# Patient Record
Sex: MALE | Race: WHITE | NOT HISPANIC OR LATINO | Employment: FULL TIME | URBAN - METROPOLITAN AREA
[De-identification: names, ages, dates, MRNs, and addresses within clinical notes are randomized per-mention and may not be internally consistent; named-entity substitution may affect disease eponyms.]

---

## 2017-06-08 ENCOUNTER — OFFICE VISIT (OUTPATIENT)
Dept: URGENT CARE | Facility: PHYSICIAN GROUP | Age: 37
End: 2017-06-08
Payer: COMMERCIAL

## 2017-06-08 VITALS
SYSTOLIC BLOOD PRESSURE: 162 MMHG | RESPIRATION RATE: 18 BRPM | WEIGHT: 204 LBS | BODY MASS INDEX: 31.03 KG/M2 | TEMPERATURE: 99.1 F | HEART RATE: 110 BPM | DIASTOLIC BLOOD PRESSURE: 94 MMHG | OXYGEN SATURATION: 96 %

## 2017-06-08 DIAGNOSIS — R03.0 ELEVATED BLOOD PRESSURE READING: ICD-10-CM

## 2017-06-08 DIAGNOSIS — R19.7 DIARRHEA, UNSPECIFIED TYPE: ICD-10-CM

## 2017-06-08 PROCEDURE — 99203 OFFICE O/P NEW LOW 30 MIN: CPT | Performed by: NURSE PRACTITIONER

## 2017-06-08 ASSESSMENT — ENCOUNTER SYMPTOMS
DIARRHEA: 1
FEVER: 0
SHORTNESS OF BREATH: 0
CONSTIPATION: 0
ORTHOPNEA: 0
DIAPHORESIS: 0
VOMITING: 0
ABDOMINAL PAIN: 0
DIZZINESS: 0
CHILLS: 0
WEAKNESS: 0
PALPITATIONS: 0
HEARTBURN: 0

## 2017-06-08 NOTE — MR AVS SNAPSHOT
Sohan Davaloswell   2017 6:15 PM   Office Visit   MRN: [id number]    Department:  [facility]   Dept Phone:  [phone]    Description:  Male : 1980   Provider:  LUCY Beavers           Reason for Visit     Diarrhea x 2 days had an episode of dark/black stool, pt did take pepto this morning      Allergies as of 2017     No Known Allergies      You were diagnosed with     Diarrhea, unspecified type   [3443323]       Elevated blood pressure reading   [[id number]]         Vital Signs     Blood Pressure Pulse Temperature Respirations Weight Oxygen Saturation    162/94 mmHg 110 37.3 °C (99.1 °F) 18 92.534 kg (204 lb) 96%    Smoking Status                   Current Every Day Smoker           Basic Information     Date Of Birth Sex Race Ethnicity Preferred Language    1980 Male White Non- English      Health Maintenance        Date Due Completion Dates    IMM DTaP/Tdap/Td Vaccine (1 - Tdap) 3/13/2000 ---            Current Immunizations     No immunizations on file.      Below and/or attached are the medications your provider expects you to take. Review all of your home medications and newly ordered medications with your provider and/or pharmacist. Follow medication instructions as directed by your provider and/or pharmacist. Please keep your medication list with you and share with your provider. Update the information when medications are discontinued, doses are changed, or new medications (including over-the-counter products) are added; and carry medication information at all times in the event of emergency situations     Allergies:  No Known Allergies          Medications  Valid as of: 2017 -  7:01 PM    Generic Name Brand Name Tablet Size Instructions for use    Aspirin (Tablet Delayed Response) aspirin 500 MG Take  by mouth.        Hydrocodone-Acetaminophen (Tab) NORCO 5-325 MG Take 1-2 Tabs by mouth every 6 hours as needed.        Hydrocodone-Acetaminophen  (Tab) NORCO 5-325 MG Take 1-2 Tabs by mouth every 6 hours as needed.        MetroNIDAZOLE (Cream) METROCREAM 0.75 % Apply thin layer to affected area(s) after washing 2 times a day.        Tadalafil (Tab) CIALIS 10 MG Take 1 Tab by mouth as needed for Erectile Dysfunction.        .                 Medicines prescribed today were sent to:     [id number] - [place], [place] - [address] [place] [address]    [address] [place] [address] [place] [address]    Phone: [phone] Fax: [phone]    Open 24 Hours?: No      Medication refill instructions:       If your prescription bottle indicates you have medication refills left, it is not necessary to call your provider’s office. Please contact your pharmacy and they will refill your medication.    If your prescription bottle indicates you do not have any refills left, you may request refills at any time through one of the following ways: The online [employer] system (except Urgent Care), by calling your provider’s office, or by asking your pharmacy to contact your provider’s office with a refill request. Medication refills are processed only during regular business hours and may not be available until the next business day. Your provider may request additional information or to have a follow-up visit with you prior to refilling your medication.   *Please Note: Medication refills are assigned a new Rx number when refilled electronically. Your pharmacy may indicate that no refills were authorized even though a new prescription for the same medication is available at the pharmacy. Please request the medicine by name with the pharmacy before contacting your provider for a refill.           [employer] Access Code: [email or web address]  Expires: 2017  7:01 PM    Your email address is not on file at [facility].  Email Addresses are required for you to sign up for [employer], please contact [phone] to verify your personal information and to provide your email address prior to attempting to register  for [employer].    Sohan Zoran  [address] [place] [address]    [employer]  A secure, online tool to manage your health information     [employer]’s RetailTower® is a secure, online tool that connects you to your personalized health information from the privacy of your home -- day or night - making it very easy for you to manage your healthcare. Once the activation process is completed, you can even access your medical information using the RetailTower mauricio, which is available for free in the Apple Mauricio store or Google Play store.     To learn more about RetailTower, visit www."[employer]"/[employer]    There are two levels of access available (as shown below):   My Chart Features  [facility] Primary Care Doctor [facility]  Specialists [facility]  Urgent  Care Non-[facility] Primary Care Doctor   Email your healthcare team securely and privately 24/7 X X X    Manage appointments: schedule your next appointment; view details of past/upcoming appointments X      Request prescription refills. X      View recent personal medical records, including lab and immunizations X X X X   View health record, including health history, allergies, medications X X X X   Read reports about your outpatient visits, procedures, consult and ER notes X X X X   See your discharge summary, which is a recap of your hospital and/or ER visit that includes your diagnosis, lab results, and care plan X X  X     How to register for RetailTower:  Once your e-mail address has been verified, follow the following steps to sign up for RetailTower.     1. Go to  [email or web address]  2. Click on the Sign Up Now box, which takes you to the New Member Sign Up page. You will need to provide the following information:  a. Enter your RetailTower Access Code exactly as it appears at the top of this page. (You will not need to use this code after you’ve completed the sign-up process. If you do not sign up before the expiration date, you must request a new code.)   b. Enter your date of birth.    c. Enter your home email address.   d. Click Submit, and follow the next screen’s instructions.  3. Create a MySiteApp ID. This will be your MySiteApp login ID and cannot be changed, so think of one that is secure and easy to remember.  4. Create a MySiteApp password. You can change your password at any time.  5. Enter your Password Reset Question and Answer. This can be used at a later time if you forget your password.   6. Enter your e-mail address. This allows you to receive e-mail notifications when new information is available in MySiteApp.  7. Click Sign Up. You can now view your health information.    For assistance activating your MySiteApp account, call [phone]         Quit Tobacco Information     Do you want to quit using tobacco?    Quitting tobacco decreases risks of cancer, heart and lung disease, increases life expectancy, improves sense of taste and smell, and increases spending money, among other benefits.    If you are thinking about quitting, we can help.  • [facility] Quit Tobacco Program: [phone]  o Program occurs weekly for four weeks and includes pharmacist consultation on products to support quitting smoking or chewing tobacco. A provider referral is needed for pharmacist consultation.  • Tobacco Users Help Hotline: 5-814-QUITNOW (204-0241) or https://[place].quitlogix.org/  o Free, confidential telephone and online coaching for [place] residents. Sessions are designed on a schedule that is convenient for you. Eligible clients receive free nicotine replacement therapy.  • Nationally: www.smokefree.gov  o Information and professional assistance to support both immediate and long-term needs as you become, and remain, a non-smoker. Smokefree.gov allows you to choose the help that best fits your needs.

## 2017-06-09 NOTE — PROGRESS NOTES
Subjective:      Sohan Meehan is a 37 y.o. male who presents with Diarrhea            Diarrhea   Pertinent negatives include no abdominal pain, chills, fever or vomiting.   Patient comes in today with a 2 day history of diarrhea.  He reports that he and his family developed GI symptoms all after eating the same thing at [employer] 3 days ago.  He had some slight nausea but is tolerating po food and fluid.  He reports watery diarrhea with 3-4 episodes per day.  He took Pepto Bismol this morning and noted a dark stool this afternoon.  No tarry appearance, stool was still thin and watery.  No abdominal pain.  NO history of GI bleed or PUD.  He is a current smoker.  He has an elevated blood pressure reading today.  He reports that his BP is elevated at times and he declines to discuss medical management at this time.  He states he had an addition to pills and now has an eversion to any pill regardless of medical benefit.      Review of Systems   Constitutional: Negative for fever, chills, malaise/fatigue and diaphoresis.   Respiratory: Negative for shortness of breath.    Cardiovascular: Negative for chest pain, palpitations, orthopnea and leg swelling.   Gastrointestinal: Positive for diarrhea. Negative for heartburn, vomiting, abdominal pain and constipation.   Neurological: Negative for dizziness and weakness.     Medications, Allergies, and current problem list reviewed today in Epic     Objective:     /94 mmHg  Pulse 110  Temp(Src) 37.3 °C (99.1 °F)  Resp 18  Wt 92.534 kg (204 lb)  SpO2 96%     Physical Exam   Constitutional: He is oriented to person, place, and time. He appears well-developed and well-nourished. No distress.   Cardiovascular: Regular rhythm and normal heart sounds.  Exam reveals no gallop and no friction rub.    No murmur heard.  Tachycardia.   Pulmonary/Chest: Effort normal and breath sounds normal. No respiratory distress. He has no wheezes. He has no rales. He exhibits no  tenderness.   Abdominal: Soft. Normal appearance. He exhibits no shifting dullness, no distension, no pulsatile liver, no fluid wave, no ascites, no pulsatile midline mass and no mass. Bowel sounds are increased. There is no tenderness. There is no rigidity, no rebound, no guarding, no CVA tenderness, no tenderness at McBurney's point and negative Burgess's sign. No hernia.   Genitourinary:   Patient declined LAURA and guaiac test.    Neurological: He is alert and oriented to person, place, and time.   Skin: He is not diaphoretic.   Vitals reviewed.              Assessment/Plan:     1. Diarrhea, unspecified type     2. Elevated blood pressure reading       Advised patient that based on the history and exam findings, this is likely a self-limiting viral illness.  Differential includes food poisoning.  Pepto likely cause of dark stool.  There is no indication for antibiotics at this time.  Advance diet as tolerated.  Maintain adequate po hydration.  Encouraged blood pressure recheck and management if indicated.  Encouraged smoking cessation.  RTC in 3-4 days if symptoms persist, sooner if worse.  ED precautions reviewed.  Patient verbalized understanding of and agreed with plan of care.

## 2017-09-22 ENCOUNTER — HOSPITAL ENCOUNTER (OUTPATIENT)
Dept: RADIOLOGY | Facility: MEDICAL CENTER | Age: 37
End: 2017-09-22
Attending: FAMILY MEDICINE
Payer: COMMERCIAL

## 2017-09-22 ENCOUNTER — OFFICE VISIT (OUTPATIENT)
Dept: URGENT CARE | Facility: PHYSICIAN GROUP | Age: 37
End: 2017-09-22
Payer: COMMERCIAL

## 2017-09-22 VITALS
BODY MASS INDEX: 31.02 KG/M2 | SYSTOLIC BLOOD PRESSURE: 146 MMHG | TEMPERATURE: 98.3 F | OXYGEN SATURATION: 98 % | DIASTOLIC BLOOD PRESSURE: 78 MMHG | WEIGHT: 204 LBS | RESPIRATION RATE: 16 BRPM | HEART RATE: 115 BPM

## 2017-09-22 DIAGNOSIS — Z82.49 FAMILY HISTORY OF ANEURYSM: ICD-10-CM

## 2017-09-22 DIAGNOSIS — I10 ESSENTIAL HYPERTENSION: ICD-10-CM

## 2017-09-22 DIAGNOSIS — M54.50 ACUTE BILATERAL LOW BACK PAIN WITHOUT SCIATICA: ICD-10-CM

## 2017-09-22 DIAGNOSIS — R51.9 ACUTE INTRACTABLE HEADACHE, UNSPECIFIED HEADACHE TYPE: ICD-10-CM

## 2017-09-22 PROCEDURE — 99204 OFFICE O/P NEW MOD 45 MIN: CPT | Mod: 25 | Performed by: FAMILY MEDICINE

## 2017-09-22 PROCEDURE — 70450 CT HEAD/BRAIN W/O DYE: CPT

## 2017-09-22 PROCEDURE — 96372 THER/PROPH/DIAG INJ SC/IM: CPT | Performed by: FAMILY MEDICINE

## 2017-09-22 RX ORDER — LISINOPRIL 20 MG/1
20 TABLET ORAL DAILY
Qty: 30 TAB | Refills: 1 | Status: SHIPPED | OUTPATIENT
Start: 2017-09-22

## 2017-09-22 RX ORDER — BUTALBITAL, ACETAMINOPHEN, CAFFEINE AND CODEINE PHOSPHATE 50; 325; 40; 30 MG/1; MG/1; MG/1; MG/1
1 CAPSULE ORAL EVERY 4 HOURS PRN
Qty: 30 CAP | Refills: 0 | Status: SHIPPED | OUTPATIENT
Start: 2017-09-22 | End: 2019-07-14

## 2017-09-22 RX ORDER — METAXALONE 800 MG/1
800 TABLET ORAL 3 TIMES DAILY
Qty: 30 TAB | Refills: 1 | Status: SHIPPED | OUTPATIENT
Start: 2017-09-22 | End: 2019-07-14

## 2017-09-22 RX ORDER — KETOROLAC TROMETHAMINE 30 MG/ML
60 INJECTION, SOLUTION INTRAMUSCULAR; INTRAVENOUS ONCE
Status: COMPLETED | OUTPATIENT
Start: 2017-09-22 | End: 2017-09-22

## 2017-09-22 RX ADMIN — KETOROLAC TROMETHAMINE 60 MG: 30 INJECTION, SOLUTION INTRAMUSCULAR; INTRAVENOUS at 17:53

## 2017-09-22 ASSESSMENT — ENCOUNTER SYMPTOMS
FOCAL WEAKNESS: 0
SENSORY CHANGE: 0
BLURRED VISION: 1
EYE REDNESS: 0
WEIGHT LOSS: 0
EYE DISCHARGE: 0

## 2017-09-22 NOTE — PROGRESS NOTES
"Subjective:      Sohan Meehan is a 37 y.o. male who presents with Headache (x 4-5 days with lower back pain)            3-4 days global HA with worse sx's in hatband region. Waxes and wanes currently 7/10 and worse with cough. Initially insidious onset and not thunderclap. Associated low back and buttock pain. Waxes and wanes/at times severe. No radiation. No myelopathy. Associated neck pain. Minimal relief with OTC medications. Sitting for long periods makes worse. No other aggravating or alleviating factors.   No fever. Chills yesterday. No recent mosquito bites. PMH HTN has not been on medications recently.         Review of Systems   Constitutional: Negative for fever, malaise/fatigue and weight loss.   HENT: Negative for ear pain, hearing loss and sore throat.    Eyes: Positive for blurred vision (intermittent with cough). Negative for discharge and redness.   Respiratory: Positive for cough (\"smokers\"). Negative for hemoptysis.    Cardiovascular: Negative for chest pain, palpitations and leg swelling.   Gastrointestinal: Negative for nausea and vomiting.   Genitourinary: Negative for flank pain and hematuria.   Musculoskeletal: Negative for myalgias and neck pain.   Skin: Negative for itching and rash.   Neurological: Negative for sensory change and focal weakness.     .  Medications, Allergies, and current problem list reviewed today in Epic  Past Medical History:   Diagnosis Date   • Headache    HTN    FH: brain aneurysm on both sides, HTN  Social History   Substance Use Topics   • Smoking status: Current Every Day Smoker   • Smokeless tobacco: Never Used   • Alcohol use No          Objective:     /78   Pulse (!) 115   Temp 36.8 °C (98.3 °F)   Resp 16   Wt 92.5 kg (204 lb)   SpO2 98%   BMI 31.02 kg/m²      Physical Exam   Constitutional: He is oriented to person, place, and time. He appears well-developed and well-nourished. No distress.   HENT:   Head: Normocephalic and atraumatic.   Right " Ear: External ear normal.   Left Ear: External ear normal.   Nose: Nose normal.   Mouth/Throat: Oropharynx is clear and moist.   Eyes: Conjunctivae and EOM are normal. Pupils are equal, round, and reactive to light.   Neck: Normal range of motion. Neck supple. No JVD present.   Cardiovascular: Normal rate, regular rhythm and normal heart sounds.    No murmur heard.  Pulmonary/Chest: Effort normal and breath sounds normal.   Abdominal: Soft. Bowel sounds are normal. He exhibits no mass. There is no tenderness.   Musculoskeletal: He exhibits no edema or tenderness.   Back: tender bilateral lower paralumbar and SI region. Flexion limited to 45 degrees. No point bony tenderness or deformity. Skin intact.      Neurological: He is alert and oriented to person, place, and time.   Bilateral lower extremity strength and sensory intact.  Negative straight leg raise.  Unable to elicit DTR bilateral LE, no clonus  Negative Romberg  Gait stable  No focal deficits   Skin: Skin is warm and dry. No rash noted.   Psychiatric: He has a normal mood and affect. His behavior is normal.               Assessment/Plan:   CT head: negative per radiology    1. Acute intractable headache, unspecified headache type  CT-HEAD W/O    ketorolac (TORADOL) injection 60 mg    butalbital-acetaminophen-caffeine-codeine (FIORICET W/CODEINE) -13-30 MG per capsule    metaxalone (SKELAXIN) 800 MG Tab   2. Essential hypertension  lisinopril (PRINIVIL) 20 MG Tab   3. Acute bilateral low back pain without sciatica     4. Family history of aneurysm         Differential diagnosis, natural history, supportive care, and indications for immediate follow-up discussed at length.   Suspect HA is tension type  He will f/u pcp with BP log and to discuss further screening of FH brain aneurysm

## 2017-09-29 ASSESSMENT — ENCOUNTER SYMPTOMS
NECK PAIN: 0
SORE THROAT: 0
HEMOPTYSIS: 0
FEVER: 0
VOMITING: 0
FLANK PAIN: 0
NAUSEA: 0
COUGH: 1
MYALGIAS: 0
PALPITATIONS: 0

## 2017-10-20 ENCOUNTER — HOSPITAL ENCOUNTER (OUTPATIENT)
Dept: RADIOLOGY | Facility: MEDICAL CENTER | Age: 37
End: 2017-10-20
Attending: FAMILY MEDICINE
Payer: COMMERCIAL

## 2017-10-20 ENCOUNTER — OFFICE VISIT (OUTPATIENT)
Dept: URGENT CARE | Facility: PHYSICIAN GROUP | Age: 37
End: 2017-10-20
Payer: COMMERCIAL

## 2017-10-20 VITALS
RESPIRATION RATE: 16 BRPM | SYSTOLIC BLOOD PRESSURE: 136 MMHG | HEIGHT: 67 IN | WEIGHT: 206 LBS | TEMPERATURE: 98.6 F | DIASTOLIC BLOOD PRESSURE: 78 MMHG | BODY MASS INDEX: 32.33 KG/M2 | HEART RATE: 86 BPM | OXYGEN SATURATION: 97 %

## 2017-10-20 DIAGNOSIS — S69.91XA INJURY OF RIGHT HAND, INITIAL ENCOUNTER: ICD-10-CM

## 2017-10-20 DIAGNOSIS — S62.316A CLOSED DISPLACED FRACTURE OF BASE OF FIFTH METACARPAL BONE OF RIGHT HAND, INITIAL ENCOUNTER: ICD-10-CM

## 2017-10-20 PROCEDURE — 99214 OFFICE O/P EST MOD 30 MIN: CPT | Performed by: FAMILY MEDICINE

## 2017-10-20 PROCEDURE — 73130 X-RAY EXAM OF HAND: CPT | Mod: RT

## 2017-10-20 RX ORDER — HYDROCODONE BITARTRATE AND ACETAMINOPHEN 5; 325 MG/1; MG/1
1-2 TABLET ORAL EVERY 6 HOURS PRN
Qty: 20 TAB | Refills: 0 | Status: SHIPPED | OUTPATIENT
Start: 2017-10-20 | End: 2019-07-14

## 2017-10-20 ASSESSMENT — ENCOUNTER SYMPTOMS
ROS SKIN COMMENTS: NO ABRASION OR LACERATION
SENSORY CHANGE: 0
WEIGHT LOSS: 0
FOCAL WEAKNESS: 0

## 2017-10-20 NOTE — PROGRESS NOTES
"Subjective:      Sohan Meehan is a 37 y.o. male who presents with Hand Injury (pt hit door tuesday)            3 days right hand pain, swelling after striking a door with fist. Initial evaluation outside of [facility] system told him his hand is fractured but no xray report or f/u plan. Pain is moderate to severe with significant swelling. Right hand dominant. No prior injury. Minimal relief with OTC analgesic. No other aggravating or alleviating factors.          Review of Systems   Constitutional: Negative for malaise/fatigue and weight loss.   Musculoskeletal:        No elbow or shoulder pain   Skin:        No abrasion or laceration     Neurological: Negative for sensory change and focal weakness.     .  Speech is clear. Patient is appropriate and cooperative.       Objective:     /78   Pulse 86   Temp 37 °C (98.6 °F)   Resp 16   Ht 1.702 m (5' 7\")   Wt 93.4 kg (206 lb)   SpO2 97%   BMI 32.26 kg/m²      Physical Exam   Constitutional: He appears well-developed and well-nourished. No distress.   HENT:   Head: Normocephalic and atraumatic.   Musculoskeletal:        Hands:              Assessment/Plan:   Xray: fracture R 5th proximal metacarpal    1. Injury of right hand, initial encounter    - DX-HAND 3+ RIGHT; Future    2. Closed displaced fracture of base of fifth metacarpal bone of right hand, initial encounter    - REFERRAL TO ORTHOPEDICS  - hydrocodone-acetaminophen (NORCO) 5-325 MG Tab per tablet; Take 1-2 Tabs by mouth every 6 hours as needed.  Dispense: 20 Tab; Refill: 0    Ulnar gutter splint placed.     "

## 2018-01-26 ENCOUNTER — HOSPITAL ENCOUNTER (OUTPATIENT)
Dept: RADIOLOGY | Facility: MEDICAL CENTER | Age: 38
End: 2018-01-26
Attending: NURSE PRACTITIONER
Payer: COMMERCIAL

## 2018-01-26 ENCOUNTER — OFFICE VISIT (OUTPATIENT)
Dept: URGENT CARE | Facility: PHYSICIAN GROUP | Age: 38
End: 2018-01-26
Payer: COMMERCIAL

## 2018-01-26 VITALS
HEART RATE: 122 BPM | BODY MASS INDEX: 32.33 KG/M2 | DIASTOLIC BLOOD PRESSURE: 74 MMHG | OXYGEN SATURATION: 94 % | SYSTOLIC BLOOD PRESSURE: 134 MMHG | HEIGHT: 67 IN | TEMPERATURE: 98.4 F | WEIGHT: 206 LBS

## 2018-01-26 DIAGNOSIS — S60.221A CONTUSION OF RIGHT HAND, INITIAL ENCOUNTER: ICD-10-CM

## 2018-01-26 DIAGNOSIS — S69.92XA HAND INJURY, LEFT, INITIAL ENCOUNTER: ICD-10-CM

## 2018-01-26 PROCEDURE — 99213 OFFICE O/P EST LOW 20 MIN: CPT | Performed by: NURSE PRACTITIONER

## 2018-01-26 PROCEDURE — 73130 X-RAY EXAM OF HAND: CPT | Mod: LT

## 2018-01-26 ASSESSMENT — ENCOUNTER SYMPTOMS
SENSORY CHANGE: 0
FALLS: 0
DIZZINESS: 0
CHILLS: 0
TINGLING: 0
MYALGIAS: 0
NAUSEA: 0

## 2018-01-26 ASSESSMENT — LIFESTYLE VARIABLES: SUBSTANCE_ABUSE: 0

## 2018-01-27 NOTE — PROGRESS NOTES
"Subjective:      Sohan Meehan is a 37 y.o. male who presents with Hand Injury (L Hand Injury. )    [facility] reviewed and updated as necessary in EPIC electronic record with patient today.  Medications including OTC medications reviewed with patient.         No Known Allergies          HPI This is a new problem C/O left hand injury at home last night. He was getting something out from under the seat of his car. His hand got stuck and he pulled it hard. Immediate pain and swelling. [place] a pop noise. Today increased pain and swelling Pain is moderate. Treatment tried: ice, rest.   Hurts worse with movement.     Review of Systems   Constitutional: Negative for chills.   Gastrointestinal: Negative for nausea.   Musculoskeletal: Positive for joint pain. Negative for falls and myalgias.   Skin: Negative for rash.   Neurological: Negative for dizziness, tingling and sensory change.   Psychiatric/Behavioral: Negative for substance abuse.          Objective:     /74   Pulse (!) 122   Temp 36.9 °C (98.4 °F)   Ht 1.702 m (5' 7\")   Wt 93.4 kg (206 lb)   SpO2 94%   BMI 32.26 kg/m²      Physical Exam   Constitutional: He is oriented to person, place, and time. Vital signs are normal. He appears well-developed and well-nourished.   HENT:   Head: Normocephalic and atraumatic.   Neck: Normal range of motion.   Cardiovascular: Normal rate, regular rhythm and normal heart sounds.    Pulmonary/Chest: Effort normal and breath sounds normal.   Musculoskeletal:        Left hand: He exhibits decreased range of motion, tenderness and swelling. He exhibits no bony tenderness, normal two-point discrimination, normal capillary refill, no deformity and no laceration. Normal sensation noted. Normal strength noted.        Hands:  Neurological: He is alert and oriented to person, place, and time.   Skin: Skin is warm and dry. Capillary refill takes less than 2 seconds.   Psychiatric: He has a normal mood and affect. His speech is normal " "and behavior is normal. Judgment and thought content normal.   Nursing note and vitals reviewed.        Xray Left hand:   1/26/2018 7:21 PM    HISTORY/REASON FOR EXAM:  Pain/Deformity Following Trauma.  LEFT hand injury    TECHNIQUE/EXAM DESCRIPTION AND NUMBER OF VIEWS:  3 views of the LEFT hand.    COMPARISON: None    FINDINGS:  Soft tissue swelling of the 2nd digit.  No radiopaque foreign body.  No soft tissue gas.  Dorsal soft tissue swelling over the metacarpals.  Carpal alignment is preserved.  No fracture or dislocation.   Impression       1.  Soft tissue swelling of the LEFT 2nd digit and over the dorsum of the hand.  2.  No fracture or dislocation.   Reading Provider Reading Date   Woodrow George M.D. Jan 26, 2018            Assessment/Plan:     1. Hand injury, left, initial encounter  DX-HAND 3+ LEFT   2. Contusion of right hand, initial encounter       \"P.R.I.C.E.\" discussed with pt (protection from further injury, rest, ice, compession and elevation).   OTC age appropriate, analgesic of choice. such as acetaminophen (Ex brand name: Tylenol), ibuprofen (Ex brand names: Advil, Motrin), or naproxen   (Ex brand names: Aleve, Naprosyn).  Follow manufactures dosing and safety precautions.   FU prn    "

## 2018-02-13 ENCOUNTER — HOSPITAL ENCOUNTER (OUTPATIENT)
Dept: RADIOLOGY | Facility: MEDICAL CENTER | Age: 38
End: 2018-02-13
Attending: FAMILY MEDICINE
Payer: COMMERCIAL

## 2018-02-13 ENCOUNTER — OFFICE VISIT (OUTPATIENT)
Dept: URGENT CARE | Facility: PHYSICIAN GROUP | Age: 38
End: 2018-02-13
Payer: COMMERCIAL

## 2018-02-13 VITALS
OXYGEN SATURATION: 96 % | TEMPERATURE: 98.2 F | HEIGHT: 67 IN | SYSTOLIC BLOOD PRESSURE: 148 MMHG | RESPIRATION RATE: 16 BRPM | DIASTOLIC BLOOD PRESSURE: 100 MMHG | BODY MASS INDEX: 30.61 KG/M2 | WEIGHT: 195 LBS | HEART RATE: 120 BPM

## 2018-02-13 DIAGNOSIS — S69.92XS: ICD-10-CM

## 2018-02-13 PROCEDURE — 99213 OFFICE O/P EST LOW 20 MIN: CPT | Performed by: FAMILY MEDICINE

## 2018-02-13 PROCEDURE — 73130 X-RAY EXAM OF HAND: CPT | Mod: LT

## 2018-02-13 ASSESSMENT — PAIN SCALES - GENERAL: PAINLEVEL: 8=MODERATE-SEVERE PAIN

## 2018-02-14 ASSESSMENT — ENCOUNTER SYMPTOMS
WEAKNESS: 0
NUMBNESS: 0
FEVER: 0
PAIN: 1

## 2018-02-14 NOTE — PROGRESS NOTES
"Subjective:   Sohan Meehan is a 37 y.o. male who presents for Pain (Left knuckle pain, x 19 days)     Pain   This is a new problem. The current episode started 1 to 4 weeks ago. The problem occurs constantly. The problem has been unchanged. Pertinent negatives include no fever, numbness or weakness.     Review of Systems   Constitutional: Negative for fever.   Neurological: Negative for weakness and numbness.      Objective:   /100   Pulse (!) 120   Temp 36.8 °C (98.2 °F)   Resp 16   Ht 1.702 m (5' 7\")   Wt 88.5 kg (195 lb)   SpO2 96%   BMI 30.54 kg/m²   Physical Exam   Constitutional: He is oriented to person, place, and time. He appears well-developed and well-nourished. No distress.   HENT:   Head: Normocephalic and atraumatic.   Eyes: Conjunctivae are normal. Pupils are equal, round, and reactive to light.   Cardiovascular: Normal rate and regular rhythm.    Pulmonary/Chest: Effort normal and breath sounds normal.   Musculoskeletal:        Left hand: He exhibits decreased range of motion, tenderness, deformity and swelling. Normal sensation noted. Normal strength noted.   Neurological: He is alert and oriented to person, place, and time.   Skin: Skin is warm and dry.   Psychiatric: He has a normal mood and affect. Thought content normal.   Vitals reviewed.       Assessment/Plan:     1. Hand injuries, left, sequela  - DX-HAND 3+ LEFT; Future  - REFERRAL TO ORTHOPEDICS  Differential diagnosis, natural history, supportive care, and indications for immediate follow-up discussed.   Use over-the-counter pain reliever, such as acetaminophen (Tylenol), ibuprofen (Advil, Motrin) or naproxen (Aleve) as needed; follow package directions for dosing.   "

## 2018-12-12 ENCOUNTER — TELEPHONE (OUTPATIENT)
Dept: SCHEDULING | Facility: IMAGING CENTER | Age: 38
End: 2018-12-12

## 2018-12-13 ENCOUNTER — OFFICE VISIT (OUTPATIENT)
Dept: URGENT CARE | Facility: PHYSICIAN GROUP | Age: 38
End: 2018-12-13
Payer: COMMERCIAL

## 2018-12-13 VITALS
DIASTOLIC BLOOD PRESSURE: 100 MMHG | BODY MASS INDEX: 31.08 KG/M2 | RESPIRATION RATE: 14 BRPM | HEART RATE: 99 BPM | WEIGHT: 198 LBS | SYSTOLIC BLOOD PRESSURE: 140 MMHG | HEIGHT: 67 IN | TEMPERATURE: 97.7 F | OXYGEN SATURATION: 96 %

## 2018-12-13 DIAGNOSIS — S29.011A MUSCLE STRAIN OF CHEST WALL, INITIAL ENCOUNTER: ICD-10-CM

## 2018-12-13 DIAGNOSIS — I10 HYPERTENSION, UNSPECIFIED TYPE: ICD-10-CM

## 2018-12-13 DIAGNOSIS — Z72.0 TOBACCO ABUSE: ICD-10-CM

## 2018-12-13 PROCEDURE — 99214 OFFICE O/P EST MOD 30 MIN: CPT | Performed by: FAMILY MEDICINE

## 2018-12-13 RX ORDER — LISINOPRIL 20 MG/1
20 TABLET ORAL DAILY
Qty: 60 TAB | Refills: 0 | Status: SHIPPED | OUTPATIENT
Start: 2018-12-13 | End: 2019-07-14

## 2018-12-13 RX ORDER — MELOXICAM 15 MG/1
15 TABLET ORAL DAILY
Qty: 30 TAB | Refills: 0 | Status: SHIPPED | OUTPATIENT
Start: 2018-12-13 | End: 2019-07-14

## 2018-12-13 NOTE — PROGRESS NOTES
"Subjective:      Chief Complaint   Patient presents with   • Blood Pressure Problem     high blood pressure/ arm muscle                       Chest Pain   This is a recurrent problem. Episode onset: 1 week.  The onset quality is undetermined. The problem occurs intermittently. The problem has been unchanged. The pain is present in the substernal region. The pain is mild. The quality of the pain is described as \"soreness\" and area is TTP. The pain does not radiate. Associated symptoms includes:  None.  Pertinent negatives include no claudication, cough, exertional chest pressure, fever, nausea, near-syncope, numbness, syncope or vomiting.   Patient has tried NSAIDs for the symptoms. The treatment provided some relief.        #2.   HTN - was previously on lisinopril.   D/c'd due to not having PCP.   He wants to restart      #3.   Current smoker.    Requesting chantix.   He has been on it before and did not have side effects.       Past Medical History:   Diagnosis Date   • Headache(784.0)          Social History   Substance Use Topics   • Smoking status: Current Every Day Smoker   • Smokeless tobacco: Never Used   • Alcohol use Yes         Current Outpatient Prescriptions on File Prior to Visit   Medication Sig Dispense Refill   • hydrocodone-acetaminophen (NORCO) 5-325 MG Tab per tablet Take 1-2 Tabs by mouth every 6 hours as needed. 20 Tab 0   • lisinopril (PRINIVIL) 20 MG Tab Take 1 Tab by mouth every day. 30 Tab 1   • butalbital-acetaminophen-caffeine-codeine (FIORICET W/CODEINE) -00-30 MG per capsule Take 1 Cap by mouth every four hours as needed for Headache. 30 Cap 0   • metaxalone (SKELAXIN) 800 MG Tab Take 1 Tab by mouth 3 times a day. 30 Tab 1   • hydrocodone-acetaminophen (NORCO) 5-325 MG TABS per tablet Take 1-2 Tabs by mouth every 6 hours as needed. 15 Tab 0   • tadalafil (CIALIS) 10 MG tablet Take 1 Tab by mouth as needed for Erectile Dysfunction. 10 Each 1   • hydrocodone-acetaminophen (NORCO) " "5-325 MG TABS per tablet Take 1-2 Tabs by mouth every 6 hours as needed. 15 Each 0   • aspirin 500 MG EC tablet Take  by mouth.     • metronidazole (METROCREAM) 0.75 % cream Apply thin layer to affected area(s) after washing 2 times a day. 45 g 5     No current facility-administered medications on file prior to visit.          History reviewed. No pertinent family history.              Review of Systems   Constitutional: Negative for fever, chills and malaise/fatigue.   Eyes: Negative for vision changes, d/c.    Respiratory: Negative for cough and sputum production.    Cardiovascular: Negative for PND or  palpitations.   Gastrointestinal: Negative for nausea, vomiting, abdominal pain, diarrhea and constipation.   Genitourinary: Negative for dysuria, urgency and frequency.   Skin: Negative for rash or  itching.   Neurological: Negative for dizziness and tingling.   Psychiatric/Behavioral: Negative for depression.   Hematologic/lymphatic - denies bruising or excessive bleeding  All other systems reviewed and are negative.       Objective:     Blood pressure 140/100, pulse 99, temperature 36.5 °C (97.7 °F), temperature source Temporal, resp. rate 14, height 1.702 m (5' 7\"), weight 89.8 kg (198 lb), SpO2 96 %.      Physical Exam   Constitutional: pt is oriented to person, place, and time. Pt appears well-developed and well-nourished.   HENT:   Head: Normocephalic and atraumatic.   Mouth/Throat: Oropharynx is clear and moist.   Eyes: Conjunctivae and EOM are normal. Pupils are equal, round, and reactive to light. No scleral icterus.   Neck: Normal range of motion. Neck supple. No JVD present. No thyromegaly present.   Cardiovascular: Normal rate, regular rhythm, normal heart sounds and intact distal pulses.  Exam reveals no gallop and no friction rub.    No murmur heard.  Pulmonary/Chest: Effort normal and breath sounds normal. No respiratory distress. Pt has no wheezes. Pt has no rales. Pt exhibits tenderness to " palpation over left pectoral muscle.   Abdominal: Soft.   Musculoskeletal: Normal range of motion. Pt exhibits no edema.   Lymphadenopathy:     Pt has no cervical adenopathy.   Neurological: pt is alert and oriented to person, place, and time. No cranial nerve deficit.   Skin: Skin is warm and dry. No erythema.   Psychiatric: pt has a normal mood and affect. patient's behavior is normal.     EKG interpretation - normal sinus rhythm. No   QRS morphology changes.   There is some concave ST elevations in septal leads diagnostic for early repolarization.  QT interval is normal. Axis is positive. No signs of ischemia.       Assessment/Plan:        1. Muscle strain of chest wall, initial encounter   EKG  reassuring  - meloxicam (MOBIC) 15 MG tablet; Take 1 Tab by mouth every day.  Dispense: 30 Tab; Refill: 0  - EKG - Clinic Performed    2. Hypertension, unspecified type   will restart on lisinopril.    He has appt with new PCP in one month    3. Tobacco abuse  Start on chantix and choose quit date.

## 2019-07-14 ENCOUNTER — OFFICE VISIT (OUTPATIENT)
Dept: URGENT CARE | Facility: PHYSICIAN GROUP | Age: 39
End: 2019-07-14
Payer: COMMERCIAL

## 2019-07-14 VITALS
DIASTOLIC BLOOD PRESSURE: 88 MMHG | HEART RATE: 94 BPM | TEMPERATURE: 98.6 F | BODY MASS INDEX: 29.29 KG/M2 | HEIGHT: 67 IN | RESPIRATION RATE: 22 BRPM | OXYGEN SATURATION: 96 % | SYSTOLIC BLOOD PRESSURE: 120 MMHG | WEIGHT: 186.6 LBS

## 2019-07-14 DIAGNOSIS — L02.419 AXILLARY ABSCESS: ICD-10-CM

## 2019-07-14 PROCEDURE — 10061 I&D ABSCESS COMP/MULTIPLE: CPT | Performed by: FAMILY MEDICINE

## 2019-07-14 RX ORDER — HYDROCODONE BITARTRATE AND ACETAMINOPHEN 5; 325 MG/1; MG/1
1-2 TABLET ORAL EVERY 8 HOURS PRN
Qty: 10 TAB | Refills: 0 | Status: SHIPPED | OUTPATIENT
Start: 2019-07-14 | End: 2019-07-19

## 2019-07-19 RX ORDER — SULFAMETHOXAZOLE AND TRIMETHOPRIM 800; 160 MG/1; MG/1
1 TABLET ORAL EVERY 12 HOURS
Qty: 14 TAB | Refills: 0 | Status: SHIPPED | OUTPATIENT
Start: 2019-07-19 | End: 2019-07-26

## 2019-07-19 ASSESSMENT — ENCOUNTER SYMPTOMS
MYALGIAS: 0
BRUISES/BLEEDS EASILY: 0
WEIGHT LOSS: 0
SENSORY CHANGE: 0
FOCAL WEAKNESS: 0

## 2019-07-19 NOTE — PROGRESS NOTES
"Subjective:      Sohan Meehan is a 39 y.o. male who presents with Mass (R arm pit x2 weeks) and Muscle Pain (x4-5 days)            2 weeks progressively worse painful lump right axilla.  Past medical history of axillary abscesses.  No fever.  No drainage.  Pain is moderate to severe.  No home remedies tried.  Pain is worse when he is moving his arm at work.  No other aggravating or alleviating factors.        Review of Systems   Constitutional: Negative for malaise/fatigue and weight loss.   Musculoskeletal: Negative for joint pain and myalgias.   Skin: Negative for itching and rash.   Neurological: Negative for sensory change and focal weakness.   Endo/Heme/Allergies: Does not bruise/bleed easily.          Objective:     /88 (BP Location: Right arm, Patient Position: Sitting, BP Cuff Size: Adult)   Pulse 94   Temp 37 °C (98.6 °F) (Temporal)   Resp (!) 22   Ht 1.702 m (5' 7.01\")   Wt 84.6 kg (186 lb 9.6 oz)   SpO2 96%   BMI 29.22 kg/m²      Physical Exam   Constitutional: He appears well-developed and well-nourished. No distress.   HENT:   Head: Normocephalic.   Cardiovascular: Normal rate, regular rhythm and normal heart sounds.    Pulmonary/Chest: Effort normal and breath sounds normal.   Skin: Skin is warm and dry. No rash noted.   2.5 cm diameter red raised fluctuant region right axilla.  No active drainage.  No lymphadenopathy.          Procedure: Incision and Drainage  -Risks, benefits, and alternatives discussed. Risks including infection, bleeding, nerve damage, and poor cosmetic outcome  -Sterile technique throughout  -Local anesthesia with 2% lidocaine with epinephrine  -Incision with #11 blade into fluctuant area with purulent material expressed    -Cavity probed and any loculations bluntly taken down with hemostat  -Irrigated copiously with NS  -Packed with 1/4\" gauze  -Minimal bleeding with good hemostasis achieved  -The patient tolerated the procedure well         Assessment/Plan:     1. " Axillary abscess  HYDROcodone-acetaminophen (NORCO) 5-325 MG Tab per tablet    Consent for Opiate Prescription    sulfamethoxazole-trimethoprim (BACTRIM DS) 800-160 MG tablet     Differential diagnosis, natural history, supportive care, and indications for immediate follow-up discussed at length.     Wound care  Follow-up in 48 hours for recheck.

## 2019-10-05 ENCOUNTER — OFFICE VISIT (OUTPATIENT)
Dept: URGENT CARE | Facility: PHYSICIAN GROUP | Age: 39
End: 2019-10-05
Payer: COMMERCIAL

## 2019-10-05 VITALS
HEIGHT: 68 IN | BODY MASS INDEX: 28.04 KG/M2 | WEIGHT: 185 LBS | SYSTOLIC BLOOD PRESSURE: 142 MMHG | TEMPERATURE: 98.5 F | RESPIRATION RATE: 16 BRPM | DIASTOLIC BLOOD PRESSURE: 106 MMHG | HEART RATE: 101 BPM | OXYGEN SATURATION: 99 %

## 2019-10-05 DIAGNOSIS — H00.012 HORDEOLUM EXTERNUM OF RIGHT LOWER EYELID: ICD-10-CM

## 2019-10-05 PROCEDURE — 99214 OFFICE O/P EST MOD 30 MIN: CPT | Performed by: PHYSICIAN ASSISTANT

## 2019-10-05 RX ORDER — POLYMYXIN B SULFATE AND TRIMETHOPRIM 1; 10000 MG/ML; [USP'U]/ML
1 SOLUTION OPHTHALMIC 4 TIMES DAILY
Qty: 10 ML | Refills: 0 | Status: SHIPPED | OUTPATIENT
Start: 2019-10-05 | End: 2019-10-10

## 2019-10-06 ASSESSMENT — ENCOUNTER SYMPTOMS
EYE DISCHARGE: 1
FOREIGN BODY SENSATION: 0
EYE REDNESS: 1
EYE ITCHING: 0
BLURRED VISION: 0

## 2019-10-07 NOTE — PROGRESS NOTES
"Subjective:      Sohan Meehan is a 39 y.o. male who presents with Eye Problem (R eye bump on lower lid, oozing, painful, red x2days, pt believes to be stye)            Patient presents with:  Eye Problem: R eye bump on lower lid, oozing, painful, red x2days, pt believes to be stye        Eye Problem    The right eye is affected. This is a new problem. The current episode started yesterday. The problem occurs constantly. The problem has been gradually worsening. There was no injury mechanism. The pain is at a severity of 5/10. The pain is moderate. There is no known exposure to pink eye. He does not wear contacts. Associated symptoms include an eye discharge and eye redness. Pertinent negatives include no blurred vision, foreign body sensation or itching. He has tried eye drops for the symptoms. The treatment provided no relief.       Review of Systems   Eyes: Positive for discharge and redness. Negative for blurred vision and itching.   All other systems reviewed and are negative.         Objective:     /106 (BP Location: Left arm, Patient Position: Sitting, BP Cuff Size: Adult)   Pulse (!) 101   Temp 36.9 °C (98.5 °F) (Temporal)   Resp 16   Ht 1.727 m (5' 8\")   Wt 83.9 kg (185 lb)   SpO2 99%   BMI 28.13 kg/m²      Physical Exam   Constitutional: He is oriented to person, place, and time. He appears well-developed and well-nourished. No distress.   HENT:   Head: Normocephalic and atraumatic.   Nose: Nose normal.   Mouth/Throat: Oropharynx is clear and moist.   Eyes: Pupils are equal, round, and reactive to light. Conjunctivae and EOM are normal. Lids are everted and swept, no foreign bodies found. Right eye exhibits discharge, exudate and hordeolum. No scleral icterus.       Neck: Normal range of motion. Neck supple. No JVD present.   Cardiovascular: Normal rate, regular rhythm and normal heart sounds.   Pulmonary/Chest: Effort normal and breath sounds normal.   Abdominal: Soft.   Musculoskeletal: " Normal range of motion.   Lymphadenopathy:     He has no cervical adenopathy.   Neurological: He is alert and oriented to person, place, and time.   Skin: Skin is warm and dry.   Nursing note and vitals reviewed.         Assessment/Plan:     1. Hordeolum externum of right lower eyelid  polymixin-trimethoprim (POLYTRIM) 93045-2.1 UNIT/ML-% Solution     PT can use cool/warm compress on affected area for relief of symptoms.     PT should follow up with PCP in 1-2 days for re-evaluation if symptoms have not improved.  Discussed red flags and reasons to return to UC or ED.  Pt and/or family verbalized understanding of diagnosis and follow up instructions and was offered informational handout on diagnosis.  PT discharged.

## 2020-04-26 ENCOUNTER — OFFICE VISIT (OUTPATIENT)
Dept: URGENT CARE | Facility: PHYSICIAN GROUP | Age: 40
End: 2020-04-26
Payer: COMMERCIAL

## 2020-04-26 VITALS
BODY MASS INDEX: 28.04 KG/M2 | WEIGHT: 185 LBS | DIASTOLIC BLOOD PRESSURE: 94 MMHG | TEMPERATURE: 97.2 F | RESPIRATION RATE: 16 BRPM | HEART RATE: 116 BPM | HEIGHT: 68 IN | SYSTOLIC BLOOD PRESSURE: 160 MMHG | OXYGEN SATURATION: 97 %

## 2020-04-26 DIAGNOSIS — M62.830 MUSCLE SPASM OF BACK: ICD-10-CM

## 2020-04-26 DIAGNOSIS — M62.838 MUSCLE SPASM OF SHOULDER REGION: ICD-10-CM

## 2020-04-26 DIAGNOSIS — S46.811A STRAIN OF RIGHT TRAPEZIUS MUSCLE, INITIAL ENCOUNTER: Primary | ICD-10-CM

## 2020-04-26 PROCEDURE — 99214 OFFICE O/P EST MOD 30 MIN: CPT | Performed by: PHYSICIAN ASSISTANT

## 2020-04-26 RX ORDER — CYCLOBENZAPRINE HCL 10 MG
10 TABLET ORAL 3 TIMES DAILY PRN
Qty: 30 TAB | Refills: 0 | Status: SHIPPED | OUTPATIENT
Start: 2020-04-26

## 2020-04-26 RX ORDER — AMOXICILLIN 500 MG/1
CAPSULE ORAL
COMMUNITY
Start: 2020-04-17

## 2020-04-26 RX ORDER — IBUPROFEN 800 MG/1
TABLET ORAL
COMMUNITY
Start: 2020-04-17

## 2020-04-26 ASSESSMENT — ENCOUNTER SYMPTOMS
FALLS: 0
MUSCLE SPASMS: 1
SPUTUM PRODUCTION: 0
HEADACHES: 0
ARTHRALGIAS: 0
BACK PAIN: 1
SHORTNESS OF BREATH: 0
COUGH: 0
FEVER: 0
CHANGE IN BOWEL HABIT: 0
NECK PAIN: 0
NAUSEA: 0
ABDOMINAL PAIN: 0
PALPITATIONS: 0
JOINT SWELLING: 0
MYALGIAS: 1
ANOREXIA: 0

## 2020-04-26 NOTE — PROGRESS NOTES
Subjective:      Sohan Meehan is a 40 y.o. male who presents with Arm Pain (R arm, back pain, chest [id number] )    PMH:  has a past medical history of Headache(784.0). He also has no past medical history of ASTHMA, Diabetes, or Diabetes ([facility]).  MEDS:   Current Outpatient Medications:   •  amoxicillin (AMOXIL) 500 MG Cap, 1 CAP ORALLY 3 TIMES A DAY AS DIRECTED DON T CONSUME WITH ALCOHOL.MAY INTERFERE WITH BIRTH CONTROL, Disp: , Rfl:   •  ibuprofen (MOTRIN) 800 MG Tab, 1 TAB EVERY 12 HOURS AS NEEDED FOR PAIN.DON T TAKE WITH ALCOHOL.MAY INTERFERE WITH BIRTH CONTROL MED, Disp: , Rfl:   •  cyclobenzaprine (FLEXERIL) 10 MG Tab, Take 1 Tab by mouth 3 times a day as needed for Moderate Pain or Muscle Spasms., Disp: 30 Tab, Rfl: 0  •  lisinopril (PRINIVIL) 20 MG Tab, Take 1 Tab by mouth every day. (Patient not taking: Reported on 10/5/2019), Disp: 30 Tab, Rfl: 1  •  aspirin 500 MG EC tablet, Take  by mouth., Disp: , Rfl:   ALLERGIES: No Known Allergies  SURGHX: History reviewed. No pertinent surgical history.  SOCHX:  reports that he has been smoking cigarettes. He has never used smokeless tobacco. He reports current alcohol use. He reports that he does not use drugs.  FH: Reviewed with patient, not pertinent to this visit.           PT co right upper back, right bicep , right pectoral muscle and right low back muscle strain and spasm that began last week after moving a significant amount of furniture out of his home by himself.  Pt states he has been taking otc motrin with little relief.  Pt had a tooth pulled last week is still on amoxicillin.  Was given some hydrocodone for a few days for post op pain, has finished those.  Not taking any other medications. Pt states muscle spasms did not improve with pain medication, has used muscle relaxant before with good relief.   Pt is still working as a /.      Spasms   This is a new problem. The current episode started in the past 7 days. The problem occurs  "constantly. The problem has been unchanged. Associated symptoms include myalgias. Pertinent negatives include no abdominal pain, anorexia, arthralgias, change in bowel habit, chest pain, congestion, coughing, fever, headaches, joint swelling, nausea or neck pain. The symptoms are aggravated by bending, exertion, walking, standing and twisting (lifting/carrying). He has tried NSAIDs, position changes, rest and acetaminophen for the symptoms. The treatment provided no relief.       Review of Systems   Constitutional: Negative for fever.   HENT: Negative for congestion.    Respiratory: Negative for cough, sputum production and shortness of breath.    Cardiovascular: Negative for chest pain, palpitations and leg swelling.   Gastrointestinal: Negative for abdominal pain, anorexia, change in bowel habit and nausea.   Musculoskeletal: Positive for back pain, muscle spasms and myalgias. Negative for arthralgias, falls, joint pain, joint swelling and neck pain.   Neurological: Negative for headaches.   All other systems reviewed and are negative.         Objective:     /94   Pulse (!) 116   Temp 36.2 °C (97.2 °F) (Temporal)   Resp 16   Ht 1.727 m (5' 8\")   Wt 83.9 kg (185 lb)   SpO2 97%   BMI 28.13 kg/m²      Physical Exam  Vitals signs and nursing note reviewed.   Constitutional:       General: He is not in acute distress.     Appearance: Normal appearance. He is well-developed and normal weight. He is not ill-appearing.   HENT:      Head: Normocephalic and atraumatic.      Right Ear: External ear normal.      Left Ear: External ear normal.      Nose: Nose normal.      Mouth/Throat:      Mouth: Mucous membranes are moist.   Eyes:      Extraocular Movements: Extraocular movements intact.      Conjunctiva/sclera: Conjunctivae normal.      Pupils: Pupils are equal, round, and reactive to light.   Neck:      Musculoskeletal: Normal range of motion and neck supple.   Cardiovascular:      Rate and Rhythm: Regular " rhythm. Tachycardia present.      Pulses: Normal pulses.      Heart sounds: Normal heart sounds.   Pulmonary:      Effort: Pulmonary effort is normal.      Breath sounds: Normal breath sounds.   Abdominal:      General: Bowel sounds are normal.      Palpations: Abdomen is soft.   Musculoskeletal:      Right shoulder: He exhibits pain and spasm. He exhibits normal range of motion, no bony tenderness, no swelling, normal pulse and normal strength.      Cervical back: He exhibits tenderness, pain and spasm. He exhibits normal range of motion and no bony tenderness.      Lumbar back: He exhibits decreased range of motion (Secondary to pain/spasm), tenderness, pain and spasm. He exhibits no bony tenderness and normal pulse.        Back:         Arms:       Comments: DISTAL N/V INTACT TO BLE, NO SADDLE ANESTHESIA NOTED, NO MIDLINE TTP TO ENTIRE SPINE.    Skin:     General: Skin is warm and dry.   Neurological:      Mental Status: He is alert and oriented to person, place, and time.      Motor: No abnormal muscle tone.      Coordination: Coordination normal.      Deep Tendon Reflexes: Reflexes are normal and symmetric.   Psychiatric:         Mood and Affect: Mood normal.            Assessment/Plan:     1. Strain of right trapezius muscle, initial encounter  cyclobenzaprine (FLEXERIL) 10 MG Tab   2. Muscle spasm of back  cyclobenzaprine (FLEXERIL) 10 MG Tab   3. Muscle spasm of shoulder region  cyclobenzaprine (FLEXERIL) 10 MG Tab     Motrin/Advil/Ibuprophen 600 mg every 6 hours as needed for pain or fever.    PT instructed not to drive or operate heavy machinery or drink alcohol while taking this medication because it contains either a narcotic or benzodiazepines which causes drowsiness. PT verbalized understanding of these instructions.     [facility] Aware web site evaluation: I have obtained and reviewed patient utilization report from [facility] pharmacy database prior to writing prescription for controlled  substance.  No history of abuse.    PT should follow up with PCP in 1-2 days for re-evaluation if symptoms have not improved.  Discussed red flags and reasons to return to UC or ED.  Pt and/or family verbalized understanding of diagnosis and follow up instructions and was offered informational handout on diagnosis.  PT discharged.

## 2020-04-27 ENCOUNTER — TELEPHONE (OUTPATIENT)
Dept: URGENT CARE | Facility: PHYSICIAN GROUP | Age: 40
End: 2020-04-27

## 2020-04-29 NOTE — TELEPHONE ENCOUNTER
We never once discussed blood pressure medication during his visit, this was not one of his complaints.  PT will have to be seen by primary to get BP medication.  Please call him and tell him he needs to establish with one if he does not have one.  Kehinde might be able to help with that.  Thanks, Madeline

## 2021-02-15 ENCOUNTER — HOSPITAL ENCOUNTER (OUTPATIENT)
Facility: MEDICAL CENTER | Age: 41
End: 2021-02-15
Attending: PHYSICIAN ASSISTANT
Payer: COMMERCIAL

## 2021-02-15 ENCOUNTER — OFFICE VISIT (OUTPATIENT)
Dept: URGENT CARE | Facility: PHYSICIAN GROUP | Age: 41
End: 2021-02-15
Payer: COMMERCIAL

## 2021-02-15 VITALS
TEMPERATURE: 97.9 F | BODY MASS INDEX: 30.61 KG/M2 | HEIGHT: 67 IN | RESPIRATION RATE: 18 BRPM | DIASTOLIC BLOOD PRESSURE: 108 MMHG | OXYGEN SATURATION: 97 % | WEIGHT: 195 LBS | SYSTOLIC BLOOD PRESSURE: 154 MMHG | HEART RATE: 110 BPM

## 2021-02-15 DIAGNOSIS — M77.11 LATERAL EPICONDYLITIS OF RIGHT ELBOW: ICD-10-CM

## 2021-02-15 DIAGNOSIS — M25.562 ACUTE PAIN OF LEFT KNEE: ICD-10-CM

## 2021-02-15 LAB — URATE SERPL-MCNC: 8 MG/DL (ref 2.5–8.3)

## 2021-02-15 PROCEDURE — 99214 OFFICE O/P EST MOD 30 MIN: CPT | Performed by: PHYSICIAN ASSISTANT

## 2021-02-15 PROCEDURE — 84550 ASSAY OF BLOOD/URIC ACID: CPT

## 2021-02-15 RX ORDER — KETOROLAC TROMETHAMINE 30 MG/ML
30 INJECTION, SOLUTION INTRAMUSCULAR; INTRAVENOUS ONCE
Status: COMPLETED | OUTPATIENT
Start: 2021-02-15 | End: 2021-02-15

## 2021-02-15 RX ORDER — INDOMETHACIN 50 MG/1
50 CAPSULE ORAL 3 TIMES DAILY
Qty: 60 CAPSULE | Refills: 0 | Status: SHIPPED | OUTPATIENT
Start: 2021-02-15

## 2021-02-15 RX ORDER — KETOROLAC TROMETHAMINE 30 MG/ML
30 INJECTION, SOLUTION INTRAMUSCULAR; INTRAVENOUS ONCE
Status: DISCONTINUED | OUTPATIENT
Start: 2021-02-15 | End: 2021-02-15

## 2021-02-15 RX ORDER — OXYCODONE AND ACETAMINOPHEN 10; 325 MG/1; MG/1
1 TABLET ORAL EVERY 4 HOURS PRN
COMMUNITY

## 2021-02-15 RX ADMIN — KETOROLAC TROMETHAMINE 30 MG: 30 INJECTION, SOLUTION INTRAMUSCULAR; INTRAVENOUS at 13:48

## 2021-02-15 ASSESSMENT — ENCOUNTER SYMPTOMS
CHILLS: 0
FEVER: 0
DIZZINESS: 0
FATIGUE: 0
NAUSEA: 0
DIARRHEA: 0
VOMITING: 0
FALLS: 0
NUMBNESS: 0
SHORTNESS OF BREATH: 0
JOINT SWELLING: 1
ABDOMINAL PAIN: 0

## 2021-02-15 NOTE — PROGRESS NOTES
"Subjective:      Sohan Meehan is a 40 y.o. male who presents with Knee Pain (L knee pain and swelling  calf swelling , R arm pain, )            Knee Pain  This is a new problem. The current episode started yesterday. The problem occurs constantly. The problem has been unchanged. Associated symptoms include joint swelling. Pertinent negatives include no abdominal pain, chest pain, chills, congestion, fatigue, fever, nausea, numbness, rash or vomiting. Nothing aggravates the symptoms. He has tried nothing for the symptoms.     Patient presents to urgent care reporting left knee pain and swelling starting yesterday, gradually worsening in severity. No recent falls or trauma. No history of the same. He denies personal history of gout, although states it runs in his family. No history of kidney disease. He also reports right elbow pain that has been waxing and waning for 3 weeks. No recent injuries. He is right hand dominant and states he uses his right arm frequently at home and at work. No prior injuries. He hasn't tried taking any OTC medications for the pain.       Review of Systems   Constitutional: Negative for chills, fatigue and fever.   HENT: Negative for congestion.    Respiratory: Negative for shortness of breath.    Cardiovascular: Negative for chest pain.   Gastrointestinal: Negative for abdominal pain, diarrhea, nausea and vomiting.   Genitourinary: Negative.    Musculoskeletal: Positive for joint swelling. Negative for falls.        + knee pain   Skin: Negative for rash.   Neurological: Negative for dizziness and numbness.        Objective:     /108   Pulse (!) 110   Temp 36.6 °C (97.9 °F) (Temporal)   Resp 18   Ht 1.702 m (5' 7\")   Wt 88.5 kg (195 lb)   SpO2 97%   BMI 30.54 kg/m²        Physical Exam  Vitals and nursing note reviewed.   Constitutional:       General: He is not in acute distress.     Appearance: Normal appearance. He is well-developed. He is not ill-appearing.   HENT:      " Head: Normocephalic and atraumatic.      Right Ear: External ear normal.      Left Ear: External ear normal.   Eyes:      Conjunctiva/sclera: Conjunctivae normal.   Cardiovascular:      Rate and Rhythm: Normal rate.   Pulmonary:      Effort: Pulmonary effort is normal.   Musculoskeletal:         General: Normal range of motion.      Right elbow: No swelling or deformity. Normal range of motion. Tenderness present in lateral epicondyle. No radial head or medial epicondyle tenderness.      Cervical back: Normal range of motion.        Legs:       Comments: Left knee erythematous with slight edema and warmth to touch. Patient unable to fully flex or extend knee secondary to pain. No overlying abrasions or puncture wounds/    Skin:     General: Skin is warm and dry.   Neurological:      Mental Status: He is alert and oriented to person, place, and time.   Psychiatric:         Behavior: Behavior normal.             PMH:  has a past medical history of Headache(784.0). He also has no past medical history of ASTHMA, Diabetes, or Diabetes ([facility]).  MEDS:   Current Outpatient Medications:   •  oxyCODONE-acetaminophen (PERCOCET-10)  MG Tab, Take 1 tablet by mouth every four hours as needed for Severe Pain., Disp: , Rfl:   •  indomethacin (INDOCIN) 50 MG Cap, Take 1 capsule by mouth 3 times a day., Disp: 60 capsule, Rfl: 0  •  lisinopril (PRINIVIL) 20 MG Tab, Take 1 Tab by mouth every day., Disp: 30 Tab, Rfl: 1  •  amoxicillin (AMOXIL) 500 MG Cap, 1 CAP ORALLY 3 TIMES A DAY AS DIRECTED DON T CONSUME WITH ALCOHOL.MAY INTERFERE WITH BIRTH CONTROL, Disp: , Rfl:   •  ibuprofen (MOTRIN) 800 MG Tab, 1 TAB EVERY 12 HOURS AS NEEDED FOR PAIN.DON T TAKE WITH ALCOHOL.MAY INTERFERE WITH BIRTH CONTROL MED, Disp: , Rfl:   •  cyclobenzaprine (FLEXERIL) 10 MG Tab, Take 1 Tab by mouth 3 times a day as needed for Moderate Pain or Muscle Spasms. (Patient not taking: Reported on 2/15/2021), Disp: 30 Tab, Rfl: 0  •  aspirin 500 MG EC tablet,  Take  by mouth., Disp: , Rfl:   ALLERGIES: No Known Allergies  SURGHX: History reviewed. No pertinent surgical history.  SOCHX:  reports that he has been smoking cigarettes. He has been smoking about 1.00 pack per day. He has never used smokeless tobacco. He reports current alcohol use of about 2.4 oz of alcohol per week. He reports that he does not use drugs.  FH: family history is not on file.    Assessment/Plan:        1. Acute pain of left knee    - URIC ACID, SERUM  - indomethacin (INDOCIN) 50 MG Cap; Take 1 capsule by mouth 3 times a day.  Dispense: 60 capsule; Refill: 0  - ketorolac (TORADOL) injection 30 mg   - Given in urgent care with moderate relief of pain after 15 minutes of monitoring in clinic     Suspect acute gout flare, will check uric acid level at today's visit. Encouraged icing 2-3 times daily, increased fluid intake, and indomethacin until resolved. Advised to avoid foods high in purines and alcohol. Information handout provided. Call or return to office if symptoms persist or worsen. The patient demonstrated a good understanding and agreed with the treatment plan.      2. Lateral epicondylitis of right elbow    Encouraged icing 2-3 times daily, rest, and use of antiinflammatories as needed until symptoms resolve. Call or return to office if symptoms persist or worsen. The patient demonstrated a good understanding and agreed with the treatment plan.

## 2021-02-16 ENCOUNTER — TELEPHONE (OUTPATIENT)
Dept: URGENT CARE | Facility: CLINIC | Age: 41
End: 2021-02-16

## 2021-02-16 NOTE — TELEPHONE ENCOUNTER
Left message for patient informing him of uric acid results. Encouraged to return my call with any questions or concerns.

## 2024-03-31 ENCOUNTER — OFFICE VISIT (OUTPATIENT)
Dept: URGENT CARE | Facility: PHYSICIAN GROUP | Age: 44
End: 2024-03-31
Payer: COMMERCIAL

## 2024-03-31 VITALS
BODY MASS INDEX: 35.54 KG/M2 | OXYGEN SATURATION: 94 % | SYSTOLIC BLOOD PRESSURE: 148 MMHG | RESPIRATION RATE: 16 BRPM | HEART RATE: 95 BPM | DIASTOLIC BLOOD PRESSURE: 86 MMHG | TEMPERATURE: 97.6 F | HEIGHT: 67 IN | WEIGHT: 226.41 LBS

## 2024-03-31 DIAGNOSIS — I10 HYPERTENSION, UNSPECIFIED TYPE: ICD-10-CM

## 2024-03-31 DIAGNOSIS — K42.9 UMBILICAL HERNIA WITHOUT OBSTRUCTION AND WITHOUT GANGRENE: ICD-10-CM

## 2024-03-31 DIAGNOSIS — L30.9 DERMATITIS OF EXTERNAL EAR: ICD-10-CM

## 2024-03-31 DIAGNOSIS — Z75.8 DOES NOT HAVE PRIMARY CARE PROVIDER: ICD-10-CM

## 2024-03-31 DIAGNOSIS — M54.50 ACUTE BILATERAL LOW BACK PAIN, UNSPECIFIED WHETHER SCIATICA PRESENT: ICD-10-CM

## 2024-03-31 DIAGNOSIS — G47.9 DIFFICULTY SLEEPING: ICD-10-CM

## 2024-03-31 DIAGNOSIS — Z71.6 ENCOUNTER FOR SMOKING CESSATION COUNSELING: ICD-10-CM

## 2024-03-31 RX ORDER — VARENICLINE TARTRATE 0.5 (11)-1
KIT ORAL
Qty: 53 EACH | Refills: 0 | Status: SHIPPED | OUTPATIENT
Start: 2024-03-31

## 2024-03-31 RX ORDER — CIPROFLOXACIN AND DEXAMETHASONE 3; 1 MG/ML; MG/ML
4 SUSPENSION/ DROPS AURICULAR (OTIC) 2 TIMES DAILY
Qty: 2.8 ML | Refills: 0 | Status: SHIPPED | OUTPATIENT
Start: 2024-03-31 | End: 2024-04-07

## 2024-03-31 RX ORDER — HYDROXYZINE 50 MG/1
50 TABLET, FILM COATED ORAL
Qty: 30 TABLET | Refills: 0 | Status: SHIPPED | OUTPATIENT
Start: 2024-03-31

## 2024-03-31 ASSESSMENT — ENCOUNTER SYMPTOMS
DEPRESSION: 0
BACK PAIN: 1
SHORTNESS OF BREATH: 0
FEVER: 0

## 2024-03-31 NOTE — PROGRESS NOTES
Subjective:     CHIEF COMPLAINT  Chief Complaint   Patient presents with    Otalgia     X2-3 years. C/o b/l ear dryness, fullness, and skin peeling. Has tried oils and lavages but pain is ongoing.    Hernia     X2-3 years. Had XR in [place] and was told it wasn't wrapped around anything and didn't need surgical removal.     Back Pain     Having difficulty stretching & exercising without pain when bending over at a certain angle.        HPI  Sohan Meehan is a very pleasant 44 y.o. male who presents with several issues he would like to discuss today.  He does not currently have a primary care provider.  He reports that he has been experiencing bilateral ear discomfort, itching, and clear discharge for approximately 2  years.  He reports that he gets flaky skin in his ear canals and discharge that appears similar to crystals.  He has tried using an over-the-counter spray that helped slightly, but did not improve.  He is concerned that he may have psoriasis in his ear canals.    Additionally, the patient reports that he has a long-term history of smoking and is ready to quit.  He is interested in taking Chantix.  He is aware of possible adverse effects and reactions.  He would like to initiate this medication before  becoming established with a primary care provider.    Additionally, the patient has an umbilical hernia that has been present for approximately 2 years that occurred after lifting heavy items at work.  The patient lives in [place] but is working in [employer] and would like to have the hernia repaired in this area.  He reports that he is able to push the hernia in, but certain days the hernia hurts more than others.  He has not had any fevers.  He has had normal bowel movements.    Additionally, the patient has been experiencing low back pain that has been ongoing for years.  The pain sometimes radiates into his left buttock.  He denies any weakness or numbness in his legs.  The patient reports  that he discontinued taking his blood pressure medication over 2 years ago because he ran out.  He is interested in becoming reestablished on his blood pressure medication once he sees his primary care provider.    Lastly, the patient is interested in taking a nonbenzodiazepine sleep aid.  He reports that he gets nighttime insomnia secondary to worrying about bills.    REVIEW OF SYSTEMS  Review of Systems   Constitutional:  Negative for fever.   HENT:  Positive for ear discharge and ear pain.    Respiratory:  Negative for shortness of breath.    Cardiovascular:  Negative for chest pain.   Musculoskeletal:  Positive for back pain.   Skin:  Positive for itching and rash.   Psychiatric/Behavioral:  Negative for depression and suicidal ideas.        PAST MEDICAL HISTORY  There are no problems to display for this patient.      SURGICAL HISTORY  patient denies any surgical history    ALLERGIES  No Known Allergies    CURRENT MEDICATIONS  Home Medications       Reviewed by Pricila Lester P.A.-C. (Physician Assistant) on 03/31/24 at 0924  Med List Status: <None>     Medication Last Dose Status   amoxicillin (AMOXIL) 500 MG Cap Not Taking Active   aspirin 500 MG EC tablet Not Taking Active   cyclobenzaprine (FLEXERIL) 10 MG Tab Not Taking Active   ibuprofen (MOTRIN) 800 MG Tab Not Taking Active   indomethacin (INDOCIN) 50 MG Cap Not Taking Active   lisinopril (PRINIVIL) 20 MG Tab Not Taking Active   oxyCODONE-acetaminophen (PERCOCET-10)  MG Tab Not Taking Active                    SOCIAL HISTORY  Social History     Tobacco Use    Smoking status: Every Day     Current packs/day: 1.00     Types: Cigarettes    Smokeless tobacco: Never   Vaping Use    Vaping Use: Never used   Substance and Sexual Activity    Alcohol use: Not Currently     Alcohol/week: 2.4 oz     Types: 4 Cans of beer per week    Drug use: Not Currently     Types: Marijuana, Oral, Inhaled    Sexual activity: Not on file       FAMILY HISTORY  History  "reviewed. No pertinent family history.       Objective:     VITAL SIGNS: BP (!) 148/86 (BP Location: Right arm, Patient Position: Sitting, BP Cuff Size: Adult)   Pulse 95   Temp 36.4 °C (97.6 °F) (Temporal)   Resp 16   Ht 1.702 m (5' 7\") Comment: Pt reported  Wt 103 kg (226 lb 6.6 oz)   SpO2 94%   BMI 35.46 kg/m²     PHYSICAL EXAM  Physical Exam  Vitals reviewed.   Constitutional:       General: He is not in acute distress.     Appearance: Normal appearance. He is not ill-appearing.   HENT:      Head: Normocephalic and atraumatic.      Right Ear: Tympanic membrane normal. Tenderness present. No drainage.      Left Ear: Tympanic membrane normal. Tenderness present. No drainage.      Ears:      Comments: Flaking skin with underlying erythema present in bilateral external auditory canals.  No discharge or drainage present.     Nose: Nose normal.      Mouth/Throat:      Mouth: Mucous membranes are moist.   Eyes:      Conjunctiva/sclera: Conjunctivae normal.   Cardiovascular:      Rate and Rhythm: Normal rate.   Pulmonary:      Effort: Pulmonary effort is normal. No respiratory distress.   Abdominal:      Hernia: A hernia is present. Hernia is present in the umbilical area.          Comments: Umbilical hernia present with mild tenderness to palpation.  No erythema. Hernia able to be reduced with pressure   Skin:     General: Skin is warm and dry.   Neurological:      General: No focal deficit present.      Mental Status: He is alert.   Psychiatric:         Mood and Affect: Mood normal.         Assessment/Plan:     1. Hypertension, unspecified type    2. Umbilical hernia without obstruction and without gangrene  - Referral to General Surgery    3. Does not have primary care provider  - Referral to establish with PCP    4. Encounter for smoking cessation counseling  - Varenicline Tartrate, Starter, 0.5 MG X 11 & 1 MG X 42 Tablet Therapy Pack; Take 0.5 mg once daily for first three days. Take 0.5 mg twice daily for " days 4-7. Then take 1 mg twice daily for days 8 through week 11.  Dispense: 53 Each; Refill: 0    5. Acute bilateral low back pain, unspecified whether sciatica present    6. Difficulty sleeping  - hydrOXYzine HCl (ATARAX) 50 MG Tab; Take 1 Tablet by mouth at bedtime as needed for Anxiety (and insomnia).  Dispense: 30 Tablet; Refill: 0    7. Dermatitis of external ear  - ciprofloxacin/dexamethasone (CIPRODEX) 0.3-0.1 % Suspension; Administer 4 Drops into affected ear(s) 2 times a day for 7 days.  Dispense: 2.8 mL; Refill: 0  - Referral to Dermatology  -Follow up with PCP regarding elevated blood pressure readings  -Follow-up with primary care provider regarding smoking cessation -Follow-up with primary care provider regarding back pain  -Follow-up with primary care provider regarding difficulty sleeping  -Be seen in emergency department if hernia is unable to be reduced or if pain occurs  -Return to clinic as needed    MDM/Comments:  Patient has stable vital signs and is non-toxic appearing.  Patient initiated on Chantix.  Discussed possible adverse effects of this medication such as increased depression/suicidal ideation as well as some studies showing increased risk of myocardial infarction.  Patient demonstrated understanding and denies a history of anxiety, depression, or suicidal ideation.  Additionally, patient has an umbilical hernia without evidence of obstruction or gangrene on physical exam.  A referral has been placed to general surgery.  Patient does report that he occasionally gets difficulty sleeping secondary to worrying about bills.  He will trial use of hydroxyzine as needed.  Patient has dermatitis in bilateral ear canals as well as on the pinna of his right ear.  Patient has been initiated on ciprofloxacin/dexamethasone eardrops, but a referral has been placed to dermatology if he fails treatment for further investigation into cause of dermatitis.  Discussed with patient that he needs to follow-up  with his primary care provider for further management of all of these issues and to discuss restarting his blood pressure medication.  Discussed supportive care with hydration, rest, Tylenol/Ibuprofen as needed for low back pain.  Additionally discussed losing weight and performing strengthening exercises for his back such as planks. Patient demonstrated understanding of treatment plan at this time and will RTC if symptoms worsen or fail to resolve.     Total time spend caring for patient exceeded 33 minutes. This includes face-to-face time in addition to time spent charting, time spent counseling patient,  and time spent reviewing patient records in preparation for visit.     Smoking cessation counseling provided for greater than 3 minutes. Patient will follow up with their PCP.       Differential diagnosis, natural history, supportive care, and indications for immediate follow-up discussed. All questions answered. Patient agrees with the plan of care.    Follow-up as needed if symptoms worsen or fail to improve to PCP, Urgent care or Emergency Room.    I have personally reviewed prior external notes and test results pertinent to today's visit.  I have independently reviewed and interpreted all diagnostics ordered during this urgent care acute visit.   Discussed management options (risks,benefits, and alternatives to treatment). Pt expresses understanding and the treatment plan was agreed upon. Questions were encouraged and answered to pt's satisfaction.    Please note that this dictation was created using voice recognition software. I have made a reasonable attempt to correct obvious errors, but I expect that there are errors of grammar and possibly content that I did not discover before finalizing the note.

## 2024-04-09 ENCOUNTER — OFFICE VISIT (OUTPATIENT)
Dept: MEDICAL GROUP | Facility: MEDICAL CENTER | Age: 44
End: 2024-04-09
Payer: COMMERCIAL

## 2024-04-09 VITALS
BODY MASS INDEX: 35.16 KG/M2 | RESPIRATION RATE: 18 BRPM | HEART RATE: 111 BPM | OXYGEN SATURATION: 96 % | HEIGHT: 67 IN | SYSTOLIC BLOOD PRESSURE: 160 MMHG | DIASTOLIC BLOOD PRESSURE: 90 MMHG | WEIGHT: 224 LBS | TEMPERATURE: 97.7 F

## 2024-04-09 DIAGNOSIS — I10 ESSENTIAL HYPERTENSION: ICD-10-CM

## 2024-04-09 DIAGNOSIS — M54.42 CHRONIC LEFT-SIDED LOW BACK PAIN WITH LEFT-SIDED SCIATICA: ICD-10-CM

## 2024-04-09 DIAGNOSIS — Z13.29 THYROID DISORDER SCREENING: ICD-10-CM

## 2024-04-09 DIAGNOSIS — I10 PRIMARY HYPERTENSION: ICD-10-CM

## 2024-04-09 DIAGNOSIS — Z13.6 ENCOUNTER FOR LIPID SCREENING FOR CARDIOVASCULAR DISEASE: ICD-10-CM

## 2024-04-09 DIAGNOSIS — L29.9 PRURITUS: ICD-10-CM

## 2024-04-09 DIAGNOSIS — Z72.0 TOBACCO ABUSE: ICD-10-CM

## 2024-04-09 DIAGNOSIS — Z13.220 ENCOUNTER FOR LIPID SCREENING FOR CARDIOVASCULAR DISEASE: ICD-10-CM

## 2024-04-09 DIAGNOSIS — G89.29 CHRONIC LEFT-SIDED LOW BACK PAIN WITH LEFT-SIDED SCIATICA: ICD-10-CM

## 2024-04-09 DIAGNOSIS — F51.01 PRIMARY INSOMNIA: ICD-10-CM

## 2024-04-09 DIAGNOSIS — Z00.00 ENCOUNTER FOR MEDICAL EXAMINATION TO ESTABLISH CARE: Primary | ICD-10-CM

## 2024-04-09 PROBLEM — G47.00 INSOMNIA: Status: ACTIVE | Noted: 2024-04-09

## 2024-04-09 PROCEDURE — 3077F SYST BP >= 140 MM HG: CPT | Performed by: FAMILY MEDICINE

## 2024-04-09 PROCEDURE — 99214 OFFICE O/P EST MOD 30 MIN: CPT | Performed by: FAMILY MEDICINE

## 2024-04-09 PROCEDURE — 3080F DIAST BP >= 90 MM HG: CPT | Performed by: FAMILY MEDICINE

## 2024-04-09 RX ORDER — TRIAMCINOLONE ACETONIDE 1 MG/G
1 CREAM TOPICAL 2 TIMES DAILY
Qty: 45 G | Refills: 1 | Status: SHIPPED | OUTPATIENT
Start: 2024-04-09

## 2024-04-09 RX ORDER — LISINOPRIL 20 MG/1
20 TABLET ORAL DAILY
Qty: 90 TABLET | Refills: 3 | Status: SHIPPED | OUTPATIENT
Start: 2024-04-09

## 2024-04-09 RX ORDER — VARENICLINE TARTRATE 1 MG/1
1 TABLET, FILM COATED ORAL 2 TIMES DAILY
Qty: 60 TABLET | Refills: 3 | Status: SHIPPED | OUTPATIENT
Start: 2024-04-09

## 2024-04-09 RX ORDER — PREDNISONE 20 MG/1
TABLET ORAL
Qty: 11 TABLET | Refills: 0 | Status: SHIPPED | OUTPATIENT
Start: 2024-04-09 | End: 2024-04-16

## 2024-04-09 NOTE — PROGRESS NOTES
Verbal consent was acquired by the patient to use [employer] ambient listening note generation during this visit: Yes      HPI:    Sohan Meehan is a pleasant 44 y.o. male here to establish care.    History of Present Illness  The patient is a 44-year-old male who is here to establish care.    The patient reports persistent hypertension. He was previously prescribed lisinopril, but discontinued its use due to travel commitments. He expresses uncertainty regarding the efficacy of lisinopril.    The patient has not been prescribed hydroxyzine due to extended work hours, which disrupts his sleep. He sought the assistance of a [facility] physician from the Emergency Room, who prescribed a low-dose Xanax due to his addiction to pain medication. He expresses a dislike for narcotics. His sleep disturbances are exacerbated by financial stress.    Today is his last day of Chantix 1 mg, administered twice daily. He denies regular use of electronic cigarettes. He has been smoking since 1993, averaging one pack per day. His occupation as an animal  involves handling metals.    The patient has been using steroid ear drops for an outer ear infection or eczema, which he reports as effective. He experiences pruritus in his ears. His ears exhibit dry and flaky skin, resembling dandruff.    The patient reports low back pain with sciatica, which occasionally radiates into his buttocks. He finds relief from stretching and using a vibrating machine. He occasionally walks with a limp due to discomfort. He is considering chiropractic treatment.    Supplemental Information  He has had his appendix taken out, some teeth pulled and a few broken bones, but he was never knocked out for the teeth pulling. He has sun spots on his head. He picks one spot every now and then. It is dry consistently. He soaks his head and shaves it. He likes to do plasma, but his ears get bright red. He urinates a lot at night, but not enough during  the day.   He used to be a heavy drinker, but not so much in the last 5 years. He may have a shot of beer on his birthday. He smokes pot, but it is few and far between because of his job. He is not currently sexually active.   His mother has stage 4 COPD and bone-to-bone arthritis in her hips. His father had quadruple bypass and some stents. He was a big smoker. His paternal grandmother had COPD.   The patient has no known allergies.    Current medicines (including changes today)  Current Outpatient Medications   Medication Sig Dispense Refill    varenicline (CHANTIX) 1 MG tablet Take 1 Tablet by mouth 2 times a day. 60 Tablet 3    lisinopril (PRINIVIL) 20 MG Tab Take 1 Tablet by mouth every day. 90 Tablet 3    predniSONE (DELTASONE) 20 MG Tab Take 2 Tablets by mouth every day for 4 days, THEN 1 Tablet every day for 3 days. 11 Tablet 0    triamcinolone acetonide (KENALOG) 0.1 % Cream Apply 1 Application topically 2 times a day. 45 g 1    hydrOXYzine HCl (ATARAX) 50 MG Tab Take 1 Tablet by mouth at bedtime as needed for Anxiety (and insomnia). 30 Tablet 0     No current facility-administered medications for this visit.       Past Medical/ Surgical History  He  has a past medical history of Headache(784.0).    He has no past medical history of ASTHMA, Diabetes, or Diabetes (HCC).  He  has a past surgical history that includes appendectomy.    Social History  Social History     Tobacco Use    Smoking status: Every Day     Current packs/day: 1.00     Average packs/day: 1 pack/day for 31.3 years (31.3 ttl pk-yrs)     Types: Cigarettes     Start date: 1993    Smokeless tobacco: Never    Tobacco comments:     Planning on quit   Vaping Use    Vaping Use: Never used   Substance Use Topics    Alcohol use: Not Currently     Alcohol/week: 2.4 oz     Types: 4 Cans of beer per week     Comment: Heavy, but quit about 5 years ago    Drug use: Not Currently     Types: Marijuana, Oral, Inhaled     Comment: rare     Social History  "    Social History Narrative    Not on file        Family History  Family History   Problem Relation Age of Onset    Lung Disease Mother         COPD    Arthritis Mother     Heart Disease Father         bypass     Family Status   Relation Name Status    Mo  (Not Specified)    Fa  (Not Specified)        Objective:     BP (!) 160/90 (BP Location: Left arm, Patient Position: Sitting, BP Cuff Size: Adult)   Pulse (!) 111   Temp 36.5 °C (97.7 °F) (Temporal)   Resp 18   Ht 1.689 m (5' 6.5\")   Wt 102 kg (224 lb)   SpO2 96%  Body mass index is 35.61 kg/m².    Physical Exam  Constitutional:       General: He is not in acute distress.     Appearance: He is obese.   HENT:      Head: Normocephalic and atraumatic.      Right Ear: Tympanic membrane and external ear normal.      Left Ear: Tympanic membrane and external ear normal.      Nose: No nasal deformity.      Mouth/Throat:      Lips: Pink.      Mouth: Mucous membranes are moist.      Pharynx: Oropharynx is clear. Uvula midline. No posterior oropharyngeal erythema.   Eyes:      General: Lids are normal.      Extraocular Movements: Extraocular movements intact.      Conjunctiva/sclera: Conjunctivae normal.      Pupils: Pupils are equal, round, and reactive to light.   Neck:      Trachea: Trachea normal.   Cardiovascular:      Rate and Rhythm: Normal rate and regular rhythm.      Heart sounds: Normal heart sounds. No murmur heard.     No friction rub. No gallop.   Pulmonary:      Effort: Pulmonary effort is normal. No accessory muscle usage.      Breath sounds: Normal breath sounds. No wheezing or rales.   Abdominal:      General: Bowel sounds are normal.      Palpations: Abdomen is soft.      Tenderness: There is no abdominal tenderness.      Hernia: A hernia is present. Hernia is present in the umbilical area.   Musculoskeletal:      Cervical back: Normal range of motion and neck supple.      Right lower leg: No edema.      Left lower leg: No edema.   Lymphadenopathy: "      Cervical: No cervical adenopathy.   Skin:     General: Skin is warm and dry.      Findings: No rash.   Neurological:      General: No focal deficit present.      Mental Status: He is alert and oriented to person, place, and time. Mental status is at baseline.      GCS: GCS eye subscore is 4. GCS verbal subscore is 5. GCS motor subscore is 6.      Motor: No weakness.      Gait: Gait is intact.   Psychiatric:         Attention and Perception: Attention normal.         Mood and Affect: Mood and affect normal.         Speech: Speech normal.          Imaging:  No updated imaging    Labs:  No updated labs  Assessment and Plan:   The following treatment plan was discussed:     Assessment & Plan  1. Essential hypertension.  The patient's condition is chronic and unstable. The patient will recommence a regimen of lisinopril, starting with a dosage of 10 mg for 1 to 2 weeks, subsequently increasing to 20 mg. He is advised to monitor his blood pressure thrice weekly and monitor for symptoms such as dizziness, lightheadedness, or general malaise.    2. Tobacco abuse.  The patient's condition is chronic but showing signs of improvement. The patient will continue with Chantix 1 mg twice daily until the cravings have subsided. A refill of this medication has been sent to his preferred pharmacy.    3. Primary insomnia.  The patient's condition is chronic but stable. The patient will continue to take hydroxyzine 50 mg as needed.    4. Pruritus to bilateral ear canals.  I suspect the patient has eczema. A prescription for Kenalog cream 0.1 percent has been issued, to be applied to a Q-tip to bilateral ears to reduce inflammation.    5. Chronic left-sided low back pain with left-sided sciatica.  The patient's condition is chronic but unstable. It is suspected that his occupation may contribute to his frequent flare-ups. Prednisone 40 mg daily for 4 days, then 20 mg daily for 3 days for a total of 7-day course of steroids to  alleviate the sciatica. The patient is advised to continue with gentle stretches and exercises, focusing on core strength. A referral to physical therapy may be considered.    Follow-up  The patient is scheduled for a follow-up visit in 4 weeks to monitor his blood pressure and review labs.  Diagnoses and all orders for this visit:    Encounter for medical examination to establish care    Primary insomnia    Tobacco abuse  -     varenicline (CHANTIX) 1 MG tablet; Take 1 Tablet by mouth 2 times a day.    Essential hypertension  -     lisinopril (PRINIVIL) 20 MG Tab; Take 1 Tablet by mouth every day.  -     CBC WITH DIFFERENTIAL; Future  -     Comp Metabolic Panel; Future  -     ESTIMATED GFR; Future    Chronic left-sided low back pain with left-sided sciatica  -     predniSONE (DELTASONE) 20 MG Tab; Take 2 Tablets by mouth every day for 4 days, THEN 1 Tablet every day for 3 days.    Primary hypertension    Pruritus  -     triamcinolone acetonide (KENALOG) 0.1 % Cream; Apply 1 Application topically 2 times a day.    Encounter for lipid screening for cardiovascular disease  -     Lipid Profile; Future    Thyroid disorder screening  -     TSH+FREE T4      Followup: Return in about 4 weeks (around 5/7/2024) for Follow-up on blood pressure labs.    Please note that this dictation was created using voice recognition software. I have made every reasonable attempt to correct obvious errors, but I expect that there are errors of grammar and possibly content that I did not discover before finalizing the note.

## 2024-05-14 ENCOUNTER — APPOINTMENT (OUTPATIENT)
Dept: MEDICAL GROUP | Facility: MEDICAL CENTER | Age: 44
End: 2024-05-14
Payer: COMMERCIAL